# Patient Record
Sex: MALE | Race: WHITE
[De-identification: names, ages, dates, MRNs, and addresses within clinical notes are randomized per-mention and may not be internally consistent; named-entity substitution may affect disease eponyms.]

---

## 2021-06-21 ENCOUNTER — HOSPITAL ENCOUNTER (EMERGENCY)
Dept: HOSPITAL 94 - ER | Age: 61
Discharge: HOME | End: 2021-06-21
Payer: OTHER GOVERNMENT

## 2021-06-21 VITALS — DIASTOLIC BLOOD PRESSURE: 92 MMHG | SYSTOLIC BLOOD PRESSURE: 153 MMHG

## 2021-06-21 VITALS — BODY MASS INDEX: 24.9 KG/M2 | HEIGHT: 74 IN | WEIGHT: 194.01 LBS

## 2021-06-21 DIAGNOSIS — S80.01XA: ICD-10-CM

## 2021-06-21 DIAGNOSIS — M79.604: ICD-10-CM
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
## 2021-06-21 DIAGNOSIS — Z72.89: ICD-10-CM
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
## 2021-06-21 DIAGNOSIS — S40.011A: Primary | ICD-10-CM

## 2021-06-21 DIAGNOSIS — F11.23: ICD-10-CM

## 2021-06-21 PROCEDURE — 73564 X-RAY EXAM KNEE 4 OR MORE: CPT

## 2021-06-21 PROCEDURE — 99284 EMERGENCY DEPT VISIT MOD MDM: CPT

## 2021-06-21 PROCEDURE — 73030 X-RAY EXAM OF SHOULDER: CPT

## 2021-06-21 NOTE — NUR
spoke to [facility] pharmacist and informed that only one suboxone film pulled from 
the [facility] and needed the 2 nd film but unable to pull out as per lavon he will 
fix it and order another one.
no